# Patient Record
Sex: FEMALE | Race: WHITE
[De-identification: names, ages, dates, MRNs, and addresses within clinical notes are randomized per-mention and may not be internally consistent; named-entity substitution may affect disease eponyms.]

---
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
## 2024-02-07 ENCOUNTER — CLINICAL DOCUMENTATION (OUTPATIENT)
Dept: PHYSICAL THERAPY | Facility: CLINIC | Age: 19
End: 2024-02-07

## 2024-02-07 NOTE — FLOWSHEET NOTE
[facility]  [address] [place] [address]  Phone: [phone]  Fax: [phone]    PHYSICAL THERAPY DISCHARGE SUMMARY    Date: 2024  Patient Name: Maryellen Walker        MRN: [id number]     Acct#:   : 2005  (18 y.o.)    Physician: Carroll                                 Insurance: [facility] (Visit Limit: based on med [facility])  Medical Diagnosis: R knee pain         Rehab Codes: M25.561  Onset Date: 23      Date of Initial Eval: 23  Date of Final Treatment: 23  Total number of visits: 4    Discharge Status:  [ ] Patient recovered from condition.  Treatment Goals were met.  [ ] Patient received maximum benefit.  No further therapy indicated at this time.  [ ] Patient demonstrated improvement from condition with          of           goals met.  [ x] Patient to continue exercises/home instructions independently.  [ ] Therapy interrupted due to:  [ ] Patient has two or more no-shows/cancellations and has been discontinued per our no show/cancellation policy.  [ ] Patient has completed their prescribed number of treatment sessions.  [ ] Other:      Pain level at Evaluation was    3    /10 and at Discharge was     0   /10.    [ ] Patient returned to work.  [ ] Patient demonstrated improved level of function.  [ ] Patient has returned to previous functional level.  [ ] Patient’s current status unknown due to no-shows  [x] Patient has not returned to clinic in the last 30 days  [ ] Other:     Recommendations/Comments: As of last visit pt improving, but pt did not return for further PT.     Treatment Included:  [x] Therapeutic Exercise  [  ] Manual Therapy  [  ] Hot/Cold Pack  [  ]US  [  ] Elec-Stim    [  ] Iontophoresis [  ]Aquatics [  ]  Therapeutic Activity   [  ] Neuro Re-Education  [  ] Gait    [  ] Massage  [  ] Traction    Thank you for the patient referral to Physical Therapy. Please feel free to contact me with any questions or concerns regarding this patient's

## 2024-05-23 ENCOUNTER — HOSPITAL ENCOUNTER (OUTPATIENT)
Age: 19
Discharge: HOME | End: 2024-05-23

## 2024-05-23 PROCEDURE — 36415 COLL VENOUS BLD VENIPUNCTURE: CPT

## 2024-05-23 PROCEDURE — 86481 TB AG RESPONSE T-CELL SUSP: CPT

## 2024-05-28 LAB — T-SPOT TB TEST: NORMAL
